# Patient Record
Sex: FEMALE
[De-identification: names, ages, dates, MRNs, and addresses within clinical notes are randomized per-mention and may not be internally consistent; named-entity substitution may affect disease eponyms.]

---

## 2017-09-12 ENCOUNTER — HOSPITAL ENCOUNTER (OUTPATIENT)
Dept: HOSPITAL 42 - SDS | Age: 72
Discharge: HOME | End: 2017-09-12
Attending: UROLOGY
Payer: MEDICARE

## 2017-09-12 VITALS — SYSTOLIC BLOOD PRESSURE: 123 MMHG | OXYGEN SATURATION: 94 % | HEART RATE: 67 BPM | DIASTOLIC BLOOD PRESSURE: 61 MMHG

## 2017-09-12 VITALS — BODY MASS INDEX: 41.6 KG/M2

## 2017-09-12 VITALS — TEMPERATURE: 97.7 F

## 2017-09-12 VITALS — RESPIRATION RATE: 18 BRPM

## 2017-09-12 DIAGNOSIS — R31.9: ICD-10-CM

## 2017-09-12 DIAGNOSIS — Z79.84: ICD-10-CM

## 2017-09-12 DIAGNOSIS — C66.1: Primary | ICD-10-CM

## 2017-09-12 DIAGNOSIS — I10: ICD-10-CM

## 2017-09-12 DIAGNOSIS — N13.30: ICD-10-CM

## 2017-09-12 DIAGNOSIS — E11.9: ICD-10-CM

## 2017-09-12 PROCEDURE — 88305 TISSUE EXAM BY PATHOLOGIST: CPT

## 2017-09-12 PROCEDURE — 88108 CYTOPATH CONCENTRATE TECH: CPT

## 2017-09-12 PROCEDURE — 52354 CYSTOURETERO W/BIOPSY: CPT

## 2017-09-12 NOTE — OP
PROCEDURE DATE:  09/12/2017



PREOPERATIVE DIAGNOSIS:  Hematuria.



POSTOPERATIVE DIAGNOSIS:  Right ureteral tumor.



SURGEON:  Dr. Chua.



TYPE OF ANESTHESIA:  LMA.



DESCRIPTION OF PROCEDURE:  After adequate LMA anesthesia was given, the

patient was placed lithotomy, prepped and draped in the usual manner.  A

22-Jamaican cystourethroscope was introduced.  Inspection of the bladder

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  No other

visible tumor on the bladder.  No foreign bodies or stones.  I attempted to

do a retrograde with a cone-tipped catheter, but no dye would go up. 

Again, with an open-ended catheter and use of a Glidewire, was able to

navigate a Glidewire up in to the kidney and then passed an open-ended

catheter over the Glidewire, removing the Glidewire.  Contrast was then

injected.  Prior to contrasting being injected, urine was obtained from the

right side for cytology.  After this was done, I did a contrast injection

through the open-ended catheter, there was a hydronephrosis and there

appeared to be filling defects in the lower third of the ureter.  At this

point, I placed the sensor wire through the open-ended catheter and removed

the open-ended catheter.  I then took a semi-rigid ureteroscope under

direct vision, could advance approximately quarter the way up the right

ureter and I could not see clearly due to the tumor.  Tumor specimen was

biopsied with forceps.  The ureteroscope was then removed around the

orifice of the right ureter, I fulgurated some oozing.  Over the sensor

wire, then placed a 24 cm 6-Jamaican pigtail stent, which coiled nicely in

the renal pelvis and the bladder when the wire was removed.   Attention was

then direct to the left side, where left retrograde ureteropyelograms are

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  The bladder was

drained.  There was no evidence of any bleeding.  The cystoscope was

removed.  The patient was awake and brought to the recovery room in good

condition.







__________________________________________

Aramis Chua MD



DD:  09/12/2017 12:26:58

DT:  09/12/2017 15:43:27

Job # [id number]

## 2017-09-12 NOTE — RAD
PROCEDURE:  Fluoroscopy up to 1 hour



HISTORY:

BILATERAL RETROGRADES / STENT INSERTION  (RIGHT)



COMPARISON:





TECHNIQUE:

Fluoroscopy was provided in the operating room. 198 seconds of fluoro 

time were used. Sixteen images were submitted



FINDINGS:

The left ureter and renal collecting system are unremarkable. There 

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



On the right side there is hydronephrosis and hydroureter. There is 

placement of a right ureteral stent. Surgical hardware in the spine 

overlies the ureter and portions of the collecting system



IMPRESSION:

As above

## 2017-12-08 ENCOUNTER — HOSPITAL ENCOUNTER (EMERGENCY)
Dept: HOSPITAL 42 - ED | Age: 72
Discharge: HOME | End: 2017-12-08
Payer: MEDICARE

## 2017-12-08 VITALS — TEMPERATURE: 99 F | OXYGEN SATURATION: 96 % | DIASTOLIC BLOOD PRESSURE: 62 MMHG | SYSTOLIC BLOOD PRESSURE: 124 MMHG

## 2017-12-08 VITALS — HEART RATE: 92 BPM | RESPIRATION RATE: 18 BRPM

## 2017-12-08 VITALS — BODY MASS INDEX: 39.8 KG/M2

## 2017-12-08 DIAGNOSIS — N39.0: Primary | ICD-10-CM

## 2017-12-08 DIAGNOSIS — I10: ICD-10-CM

## 2017-12-08 DIAGNOSIS — E11.65: ICD-10-CM

## 2017-12-08 LAB
ALBUMIN/GLOB SERPL: 1.2 {RATIO} (ref 1.1–1.8)
ALP SERPL-CCNC: 69 U/L (ref 38–126)
ALT SERPL-CCNC: 29 U/L (ref 7–56)
AMORPH SED URNS QL MICRO: (no result)
APPEARANCE UR: CLEAR
AST SERPL-CCNC: 26 U/L (ref 14–36)
BACTERIA #/AREA URNS HPF: (no result) /[HPF]
BASE EXCESS BLDV CALC-SCNC: 0 MMOL/L (ref 0–2)
BASOPHILS # BLD AUTO: 0.04 K/MM3 (ref 0–2)
BASOPHILS NFR BLD: 0.2 % (ref 0–3)
BILIRUB SERPL-MCNC: 1 MG/DL (ref 0.2–1.3)
BILIRUB UR-MCNC: NEGATIVE MG/DL
BUN SERPL-MCNC: 37 MG/DL (ref 7–21)
CALCIUM SERPL-MCNC: 9.6 MG/DL (ref 8.4–10.5)
CHLORIDE SERPL-SCNC: 96 MMOL/L (ref 98–107)
CO2 SERPL-SCNC: 24 MMOL/L (ref 21–33)
COLOR UR: YELLOW
EOSINOPHIL # BLD: 0 10*3/UL (ref 0–0.7)
EOSINOPHIL NFR BLD: 0 % (ref 1.5–5)
ERYTHROCYTE [DISTWIDTH] IN BLOOD BY AUTOMATED COUNT: 13.8 % (ref 11.5–14.5)
GLOBULIN SER-MCNC: 3.2 GM/DL
GLUCOSE SERPL-MCNC: 343 MG/DL (ref 70–110)
GLUCOSE UR STRIP-MCNC: 100 MG/DL
GRANULOCYTES # BLD: 14.23 10*3/UL (ref 1.4–6.5)
GRANULOCYTES NFR BLD: 84.7 % (ref 50–68)
HCT VFR BLD CALC: 33.7 % (ref 36–48)
KETONES UR STRIP-MCNC: NEGATIVE MG/DL
LEUKOCYTE ESTERASE UR-ACNC: (no result) LEU/UL
LYMPHOCYTES # BLD: 1.2 10*3/UL (ref 1.2–3.4)
LYMPHOCYTES NFR BLD AUTO: 7.2 % (ref 22–35)
MCH RBC QN AUTO: 29 PG (ref 25–35)
MCHC RBC AUTO-ENTMCNC: 32.6 G/DL (ref 31–37)
MCV RBC AUTO: 88.9 FL (ref 80–105)
MONOCYTES # BLD AUTO: 1.3 10*3/UL (ref 0.1–0.6)
MONOCYTES NFR BLD: 7.9 % (ref 1–6)
PH BLDV: 7.34 [PH] (ref 7.32–7.43)
PH UR STRIP: 6 [PH] (ref 4.7–8)
PLATELET # BLD: 294 10^3/UL (ref 120–450)
PMV BLD AUTO: 9.3 FL (ref 7–11)
POTASSIUM SERPL-SCNC: 4.3 MMOL/L (ref 3.6–5)
PROT SERPL-MCNC: 6.9 G/DL (ref 5.8–8.3)
PROT UR STRIP-MCNC: 30 MG/DL
RBC # UR STRIP: (no result) /UL
RBC #/AREA URNS HPF: (no result) /HPF (ref 0–2)
SODIUM SERPL-SCNC: 132 MMOL/L (ref 132–148)
SP GR UR STRIP: 1.02 (ref 1–1.03)
TROPONIN I SERPL-MCNC: < 0.01 NG/ML
UROBILINOGEN UR STRIP-ACNC: 0.2 E.U./DL
WBC # BLD AUTO: 16.8 10^3/UL (ref 4.5–11)
WBC #/AREA URNS HPF: (no result) /HPF (ref 0–6)

## 2017-12-08 PROCEDURE — 87086 URINE CULTURE/COLONY COUNT: CPT

## 2017-12-08 PROCEDURE — 80053 COMPREHEN METABOLIC PANEL: CPT

## 2017-12-08 PROCEDURE — 81001 URINALYSIS AUTO W/SCOPE: CPT

## 2017-12-08 PROCEDURE — 82948 REAGENT STRIP/BLOOD GLUCOSE: CPT

## 2017-12-08 PROCEDURE — 71010: CPT

## 2017-12-08 PROCEDURE — 84484 ASSAY OF TROPONIN QUANT: CPT

## 2017-12-08 PROCEDURE — 85025 COMPLETE CBC W/AUTO DIFF WBC: CPT

## 2017-12-08 PROCEDURE — 87181 SC STD AGAR DILUTION PER AGT: CPT

## 2017-12-08 PROCEDURE — 96360 HYDRATION IV INFUSION INIT: CPT

## 2017-12-08 PROCEDURE — 99284 EMERGENCY DEPT VISIT MOD MDM: CPT

## 2017-12-08 PROCEDURE — 82803 BLOOD GASES ANY COMBINATION: CPT
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
## 2017-12-08 NOTE — RAD
HISTORY:

cough  



COMPARISON:

Chest x-ray performed 5/15/17 



TECHNIQUE:

Chest, one view.



FINDINGS:

Examination limited by habitus.



LUNGS:

No focal consolidation.



Please note that chest x-ray has limited sensitivity for the 

detection of pulmonary masses.



PLEURA:

No significant pleural effusion identified. No definite pneumothorax .



CARDIOVASCULAR:

Heart size appears top normal.



OSSEOUS STRUCTURES:

 Degenerative changes of the spine.



VISUALIZED UPPER ABDOMEN:

Unremarkable.



OTHER FINDINGS:

None.



IMPRESSION:

No focal consolidation, significant pleural effusion, or definite 

pneumothorax identified.

## 2017-12-10 NOTE — ED PDOC
ED Additional Note





- Date & Time of Evaluation


Date of Evaluation: 12/10/17


Time of Evaluation: 10:56





- Physician Additional Note


Physician Additional Note: 





I received the urine culture result from RN Susan Reyes, show +Urine culture 

show klebsiella pneumoniae ssp, sensitive to fluoroquinlones which she 

discharge home with levaquin.

## 2018-02-10 ENCOUNTER — HOSPITAL ENCOUNTER (INPATIENT)
Dept: HOSPITAL 42 - ED | Age: 73
LOS: 3 days | Discharge: NURSING, REHAB OR LONG-TERM CARE | DRG: 690 | End: 2018-02-13
Attending: INTERNAL MEDICINE | Admitting: INTERNAL MEDICINE
Payer: MEDICARE

## 2018-02-10 VITALS — BODY MASS INDEX: 39.8 KG/M2

## 2018-02-10 DIAGNOSIS — N39.0: Primary | ICD-10-CM

## 2018-02-10 DIAGNOSIS — Z79.84: ICD-10-CM

## 2018-02-10 DIAGNOSIS — B96.1: ICD-10-CM

## 2018-02-10 DIAGNOSIS — Z98.42: ICD-10-CM

## 2018-02-10 DIAGNOSIS — I10: ICD-10-CM

## 2018-02-10 DIAGNOSIS — Z85.54: ICD-10-CM

## 2018-02-10 DIAGNOSIS — E78.5: ICD-10-CM

## 2018-02-10 DIAGNOSIS — E83.42: ICD-10-CM

## 2018-02-10 DIAGNOSIS — Z16.24: ICD-10-CM

## 2018-02-10 DIAGNOSIS — Z16.12: ICD-10-CM

## 2018-02-10 DIAGNOSIS — Z98.41: ICD-10-CM

## 2018-02-10 DIAGNOSIS — E11.9: ICD-10-CM

## 2018-02-10 DIAGNOSIS — Z79.82: ICD-10-CM

## 2018-02-10 LAB
APPEARANCE UR: (no result)
BACTERIA #/AREA URNS HPF: (no result) /[HPF]
BASOPHILS # BLD AUTO: 0.05 K/MM3 (ref 0–2)
BASOPHILS NFR BLD: 0.7 % (ref 0–3)
BILIRUB UR-MCNC: NEGATIVE MG/DL
BUN SERPL-MCNC: 28 MG/DL (ref 7–21)
CALCIUM SERPL-MCNC: 10.2 MG/DL (ref 8.4–10.5)
COLOR UR: YELLOW
EOSINOPHIL # BLD: 0.4 10*3/UL (ref 0–0.7)
EOSINOPHIL NFR BLD: 5.3 % (ref 1.5–5)
ERYTHROCYTE [DISTWIDTH] IN BLOOD BY AUTOMATED COUNT: 15.7 % (ref 11.5–14.5)
GFR NON-AFRICAN AMERICAN: 44
GLUCOSE UR STRIP-MCNC: 500 MG/DL
GRANULOCYTES # BLD: 4.45 10*3/UL (ref 1.4–6.5)
GRANULOCYTES NFR BLD: 59.2 % (ref 50–68)
HGB BLD-MCNC: 10.9 G/DL (ref 12–16)
LEUKOCYTE ESTERASE UR-ACNC: (no result) LEU/UL
LYMPHOCYTES # BLD: 2.1 10*3/UL (ref 1.2–3.4)
LYMPHOCYTES NFR BLD AUTO: 27.6 % (ref 22–35)
MCH RBC QN AUTO: 26.5 PG (ref 25–35)
MCHC RBC AUTO-ENTMCNC: 30.5 G/DL (ref 31–37)
MCV RBC AUTO: 86.9 FL (ref 80–105)
MONOCYTES # BLD AUTO: 0.5 10*3/UL (ref 0.1–0.6)
MONOCYTES NFR BLD: 7.2 % (ref 1–6)
PH UR STRIP: 6 [PH] (ref 4.7–8)
PLATELET # BLD: 376 10^3/UL (ref 120–450)
PMV BLD AUTO: 9.4 FL (ref 7–11)
PROT UR STRIP-MCNC: 30 MG/DL
RBC # BLD AUTO: 4.11 10^6/UL (ref 3.5–6.1)
RBC # UR STRIP: (no result) /UL
SP GR UR STRIP: 1.02 (ref 1–1.03)
URINE NITRATE: NEGATIVE
UROBILINOGEN UR STRIP-ACNC: 0.2 E.U./DL
WBC # BLD AUTO: 7.5 10^3/UL (ref 4.5–11)
WBC #/AREA URNS HPF: (no result) /HPF (ref 0–6)

## 2018-02-10 RX ADMIN — INSULIN HUMAN SCH: 100 INJECTION, SOLUTION PARENTERAL at 22:41
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
## 2018-02-11 LAB
ALBUMIN SERPL-MCNC: 3.4 G/DL (ref 3–4.8)
ALBUMIN/GLOB SERPL: 1.1 {RATIO} (ref 1.1–1.8)
ALT SERPL-CCNC: 24 U/L (ref 7–56)
AST SERPL-CCNC: 29 U/L (ref 14–36)
BASOPHILS # BLD AUTO: 0.05 K/MM3 (ref 0–2)
BASOPHILS NFR BLD: 0.6 % (ref 0–3)
BUN SERPL-MCNC: 25 MG/DL (ref 7–21)
CALCIUM SERPL-MCNC: 9.7 MG/DL (ref 8.4–10.5)
EOSINOPHIL # BLD: 0.5 10*3/UL (ref 0–0.7)
EOSINOPHIL NFR BLD: 5.8 % (ref 1.5–5)
ERYTHROCYTE [DISTWIDTH] IN BLOOD BY AUTOMATED COUNT: 15.5 % (ref 11.5–14.5)
GFR NON-AFRICAN AMERICAN: 40
GRANULOCYTES # BLD: 5.82 10*3/UL (ref 1.4–6.5)
GRANULOCYTES NFR BLD: 65.1 % (ref 50–68)
HGB BLD-MCNC: 10 G/DL (ref 12–16)
LYMPHOCYTES # BLD: 1.8 10*3/UL (ref 1.2–3.4)
LYMPHOCYTES NFR BLD AUTO: 20.3 % (ref 22–35)
MAGNESIUM SERPL-MCNC: 1.4 MG/DL (ref 1.7–2.2)
MCH RBC QN AUTO: 26.6 PG (ref 25–35)
MCHC RBC AUTO-ENTMCNC: 31 G/DL (ref 31–37)
MCV RBC AUTO: 85.9 FL (ref 80–105)
MONOCYTES # BLD AUTO: 0.7 10*3/UL (ref 0.1–0.6)
MONOCYTES NFR BLD: 8.2 % (ref 1–6)
PLATELET # BLD: 347 10^3/UL (ref 120–450)
PMV BLD AUTO: 9.2 FL (ref 7–11)
RBC # BLD AUTO: 3.76 10^6/UL (ref 3.5–6.1)
T4 FREE SERPL-MCNC: 0.74 NG/DL (ref 0.78–2.19)
WBC # BLD AUTO: 8.9 10^3/UL (ref 4.5–11)

## 2018-02-11 RX ADMIN — INSULIN HUMAN SCH UNITS: 100 INJECTION, SOLUTION PARENTERAL at 12:04

## 2018-02-11 RX ADMIN — INSULIN HUMAN SCH UNITS: 100 INJECTION, SOLUTION PARENTERAL at 16:47

## 2018-02-11 RX ADMIN — INSULIN HUMAN SCH UNITS: 100 INJECTION, SOLUTION PARENTERAL at 07:48

## 2018-02-11 RX ADMIN — MEROPENEM AND SODIUM CHLORIDE SCH MLS/HR: 1 INJECTION, SOLUTION INTRAVENOUS at 13:44

## 2018-02-11 RX ADMIN — INSULIN HUMAN SCH: 100 INJECTION, SOLUTION PARENTERAL at 22:11

## 2018-02-11 RX ADMIN — MEROPENEM AND SODIUM CHLORIDE SCH MLS/HR: 1 INJECTION, SOLUTION INTRAVENOUS at 12:10

## 2018-02-11 RX ADMIN — MEROPENEM AND SODIUM CHLORIDE SCH MLS/HR: 1 INJECTION, SOLUTION INTRAVENOUS at 22:12

## 2018-02-11 NOTE — HP
HISTORY OF PRESENT ILLNESS:  Patient is a 73-year-old female patient of Dr. Roland, was referred to emergency room by urologist, Dr. Garcia, who did

urine culture on 02/01/2018 and was found to have resistant Klebsiella UTI,

only sensitive to IV antibiotics, so she was referred to emergency room for

IV antibiotics.  Patient does complain of increased frequency of urination

and discomfort.  Denies any fever or chills.  No history of nausea or

vomiting.  No abdominal pain.  No diarrhea.  Patient does have a history of

recurrent UTI.



PAST MEDICAL HISTORY:  Her significant past medical history is:

1.  Hypertension.

2.  Non-insulin-dependent diabetes.

3.  Ureteral cancer.



PAST SURGICAL HISTORY:  She had a surgical excision on 11/27/2017, when she

recently had a stent removed.



ALLERGIES:  SHE IS NOT ALLERGIC TO ANY MEDICATION.



MEDICATIONS AT HOME:  Patient is on metformin 1000 twice a day, glipizide

_____ mg before breakfast and 5 mg at bedtime.  She is on Cymbalta 30 mg

daily, aspirin 81 mg daily, multivitamin, Januvia 100 mg daily, Crestor 5

mg daily, losartan 100 mg daily, gabapentin 300 three times a day.



SOCIAL HISTORY:  Denies smoking, drinking, alcohol use.



REVIEW OF SYSTEMS:  Significant for urinary discomfort.



PHYSICAL EXAMINATION:

GENERAL:  She is awake, alert, oriented, communicative.

VITAL SIGNS:  She is afebrile, pulse 82, respirations 20, blood pressure

138/69.

LUNGS:  Bilateral good airflow.  No rhonchi or crackle.

HEART:  S1 and S2 audible.

ABDOMEN:  Soft, obese, nontender.  No rebound.  No guarding.

NEUROLOGIC:  She is awake, alert, oriented, able to communicate.



LABORATORY DATA:  WBC 7.5, hemoglobin 10.9, hematocrit 35.7, platelet 376. 

Chemistry:  Sodium 145, potassium 5.3, chloride 106, CO2 26, BUN 28,

creatinine 1.2, blood sugar of 204.  Urinalysis shows moderately large

leukocytes, WBCs too numerous to count.



ASSESSMENT AND PLAN:

1.  Recurrent urinary tract infection.  Sensitivity done in urologist

office shows Klebsiella.

2.  Non-insulin-dependent diabetes.

3.  Hypertension.

4.  Hyperlipidemia.



PLAN:  Patient is sensitive to cefepime, we will continue that.  She

already has received one dose of cefepime in the ER.  We will monitor her

blood sugar.  Blood cultures and urine culture have been sent, and we will

follow up her electrolytes in the a.m. and ID consult with Dr. Narayan

has been requested.





__________________________________________

Ashely Armstrong MD





DD:  02/10/2018 22:29:03

DT:  02/11/2018 4:41:05

Job # [id number]

## 2018-02-11 NOTE — CON
DATE:  02/11/2018



LOCATION:  The patient is seen earlier today in 570, bed 1.



CHIEF COMPLAINT:  The patient's possible urinary tract infection times a

few days.



HISTORY OF PRESENT ILLNESS:  This is a 73-year-old female with history of

diabetes mellitus, hypertension, urethral cancer and renal stents removed

2-3 weeks ago.  Presents to the emergency room with urinary symptoms of

frequency and was seen by Dr. Garcia, urologist on 02/01/2018 and reported

having resistant urinary tract infections, so he transferred the patient

for resistant Klebsiella and IV antibiotic treatment.  The patient states

she does not have dysuria, but she did have frequency.  She did have

low-grade fevers.  No chills.  No abdominal pain.  No flank pain.  No back

pain.  No chest pain, shortness of breath or cough.



PAST MEDICAL HISTORY:  Significant for hypertension, diabetes and

hyperlipidemia and ureteral cancer.



PAST SURGICAL HISTORY:  Significant for tubal ligation, right knee surgery,

right foot surgery and a urology surgery including the removal of the renal

stent 2-3 weeks ago.



ALLERGIES:  THE PATIENT HAS NO KNOWN ALLERGIES.



MEDICATIONS AT HOME:  Include metformin, glipizide, duloxetine, aspirin,

rosuvastatin, multivitamins.



PHYSICAL EXAMINATION:

GENERAL:  The patient is in bed, in no acute distress, answering questions

appropriately with temperature of 99, heart rate of 82, respiratory rate of

20, blood pressure is 120/40.

HEENT:  Examination of HEENT is unremarkable.

NECK:  Supple.

LUNGS:  Have decreased breath sounds.

HEART:  Normal S1, S2.

ABDOMEN:  Soft, nontender.



LABORATORY DATA:  Laboratory examination reveals a white count of 8.9,

hemoglobin of 10, platelets of 347, BUN of 28, creatinine is 1.2.  Random

glucose is elevated.  Urinalysis is noted to have too numerous wbc's. 

Urine culture has gram-negative tameka.  Urine culture from 12/2017 had ESBL

Klebsiella.



ASSESSMENT AND PLAN:  A 73-year-old female with a history of ureteral

cancer with surgery and stent placement, stent removed 2-3 weeks ago with

history of hypertension, diabetes, hyperlipidemia.  Had frequency, but no

dysuria as outpatient and seen doctors as outpatient and was given

antibiotics as outpatient and found to have resistant extended-spectrum

beta-lactamase Klebsiella on last admission and resistant organism on this

admission as outpatient.  Now #1 is a gram-negative tameka urinary tract

infection, found to be resistant as outpatient, most likely recurrence of

extended-spectrum beta-lactamase Klebsiella.  We will discontinue the

cefepime and start meropenem.  Pending the identification sensitivity of

the gram-negative tameka in the urine and we will check on the blood cultures

and we will make further recommendations upon availability of urine

cultures.  We will place the patient on extended-spectrum beta-lactamase

precautions since the patient had a positive extended-spectrum

beta-lactamase Klebsiella in December.



__________________________________________

Elver Narayan MD





DD:  02/11/2018 11:16:46

DT:  02/11/2018 11:23:21

Job # [id number]

## 2018-02-12 VITALS — RESPIRATION RATE: 20 BRPM

## 2018-02-12 RX ADMIN — INSULIN HUMAN SCH UNITS: 100 INJECTION, SOLUTION PARENTERAL at 16:20

## 2018-02-12 RX ADMIN — INSULIN HUMAN SCH UNITS: 100 INJECTION, SOLUTION PARENTERAL at 08:10

## 2018-02-12 RX ADMIN — INSULIN HUMAN SCH UNITS: 100 INJECTION, SOLUTION PARENTERAL at 12:13

## 2018-02-12 RX ADMIN — MEROPENEM AND SODIUM CHLORIDE SCH MLS/HR: 1 INJECTION, SOLUTION INTRAVENOUS at 22:05

## 2018-02-12 RX ADMIN — INSULIN HUMAN SCH: 100 INJECTION, SOLUTION PARENTERAL at 22:03

## 2018-02-12 RX ADMIN — MEROPENEM AND SODIUM CHLORIDE SCH MLS/HR: 1 INJECTION, SOLUTION INTRAVENOUS at 05:25

## 2018-02-12 NOTE — PN
DATE:



SUBJECTIVE:  Patient has no complaints of any chest pain, shortness of

breath, headaches, or dizziness.



PHYSICAL EXAMINATION:

VITAL SIGNS:  Temperature is 99.1, pulse is 71, blood pressure is 120/48,

and respirations 18.

GENERAL:  The patient is lying in bed, flat, comfortable.

HEENT:  No oral lesion.  Anicteric sclerae.  Moist mucosa.

NECK:  No JVD, adenopathy, or thyromegaly.

CARDIOVASCULAR:  S1 and S2, regular.  No murmurs, rubs, or gallops.

LUNGS:  Clear to auscultation bilaterally.  No wheeze, rales, or rhonchi.

ABDOMEN:  Bowel sounds are positive, soft, nontender, and nondistended.

EXTREMITIES:  No cyanosis, clubbing, or edema.



LABORATORY DATA:  White count is 8.9 and hemoglobin is 10.  Chemistry shows

sodium 142, potassium 4.2, magnesium is 1.4, and creatinine is 1.3.



ASSESSMENT:

1.  Urinary tract infection.

2.  Diabetes type 2.

3.  Hypertension.

4.  Dyslipidemia.

5.  Hypomagnesemia.



PLAN:  The patient had urinary tract infection.  She has gram-negative rods

that are present in her urine.  She is being followed by Dr. Narayan. 

The patient is on Cymbalta.  She is going to continue on aspirin daily. 

She is on Januvia for diabetes.  She is on meropenem for antibiotics.  We

will await culture results and further input from Dr. Narayan from

Infectious Disease.





__________________________________________

Sharif Rico MD



DD:  02/11/2018 19:03:58

DT:  02/11/2018 22:52:17

Job # [id number]

## 2018-02-12 NOTE — CP.PCM.PN
Subjective





- Date & Time of Evaluation


Date of Evaluation: 02/12/18


Time of Evaluation: 10:50





- Subjective


Subjective: 





Comfortable, no urinary symptoms currently, no fevers.





Objective





- Vital Signs/Intake and Output


Vital Signs (last 24 hours): 


 











Temp Pulse Resp BP Pulse Ox


 


 97.7 F   78   18   132/56 L  95 


 


 02/12/18 07:36  02/12/18 07:36  02/12/18 07:36  02/12/18 07:36  02/12/18 07:36








Intake and Output: 


 











 02/12/18 02/13/18





 18:59 06:59


 


Intake Total 480 


 


Balance 480 














- Medications


Medications: 


 Current Medications





Aspirin (Aspirin Chewable)  81 mg PO DAILY [facility]


   Last Admin: 02/12/18 10:04 Dose:  81 mg


Duloxetine HCl (Cymbalta)  20 mg PO DAILY [facility]


   Last Admin: 02/12/18 10:05 Dose:  20 mg


Folic Acid (Folic Acid)  1 mg PO DAILY [facility]


   Last Admin: 02/12/18 10:04 Dose:  1 mg


Meropenem/Sodium Chloride (Meropenem 1g/Ns 100ml Ivpb)  1 gm in 100 mls @ 100 

mls/hr IVPB Q12 [facility]


   PRN Reason: Protocol


   Stop: 02/21/18 11:16


Insulin Human Regular (Humulin R Low)  0 units SC ACHS [facility]


   PRN Reason: Protocol


   Last Admin: 02/12/18 16:20 Dose:  3 units


Losartan Potassium (Cozaar)  100 mg PO DAILY [facility]


   Last Admin: 02/12/18 10:04 Dose:  100 mg


Metformin HCl (Glucophage)  500 mg PO BID [facility]


Sitagliptin Phosphate (Januvia)  100 mg PO DAILY [facility]











- Labs


Labs: 


 





 02/11/18 07:30 





 02/11/18 07:30 











- Constitutional


Appears: Non-toxic





- Head Exam


Head Exam: NORMAL INSPECTION





- ENT Exam


ENT Exam: Mucous Membranes Moist





- Neck Exam


Neck Exam: absent: Meningismus





- Respiratory Exam


Respiratory Exam: Decreased Breath Sounds





- Cardiovascular Exam


Cardiovascular Exam: +S1, +S2





- GI/Abdominal Exam


GI & Abdominal Exam: Soft.  absent: Tenderness





Assessment and Plan





- Assessment and Plan (Free Text)


Plan: 





Assessment


UTI due to ESBL Klebsiella


HTN


DM


dyslpidemia


uterine CA


S/P right knee surgery


S/P right foot surgery


S/P renal stent placement





Plan


Continue Merrem day 2, should complete up to 10 days of antibiotics

## 2018-02-13 ENCOUNTER — HOSPITAL ENCOUNTER (INPATIENT)
Dept: HOSPITAL 42 - TRCU | Age: 73
LOS: 5 days | Discharge: HOME | DRG: 690 | End: 2018-02-18
Attending: INTERNAL MEDICINE | Admitting: INTERNAL MEDICINE
Payer: COMMERCIAL

## 2018-02-13 VITALS — BODY MASS INDEX: 37 KG/M2

## 2018-02-13 VITALS
SYSTOLIC BLOOD PRESSURE: 118 MMHG | TEMPERATURE: 98.6 F | HEART RATE: 61 BPM | DIASTOLIC BLOOD PRESSURE: 49 MMHG | OXYGEN SATURATION: 98 %

## 2018-02-13 DIAGNOSIS — E78.5: ICD-10-CM

## 2018-02-13 DIAGNOSIS — Z16.24: ICD-10-CM

## 2018-02-13 DIAGNOSIS — Z79.82: ICD-10-CM

## 2018-02-13 DIAGNOSIS — N30.90: Primary | ICD-10-CM

## 2018-02-13 DIAGNOSIS — E11.65: ICD-10-CM

## 2018-02-13 DIAGNOSIS — B96.1: ICD-10-CM

## 2018-02-13 DIAGNOSIS — I10: ICD-10-CM

## 2018-02-13 DIAGNOSIS — Z79.84: ICD-10-CM

## 2018-02-13 DIAGNOSIS — E66.01: ICD-10-CM

## 2018-02-13 DIAGNOSIS — Z87.440: ICD-10-CM

## 2018-02-13 DIAGNOSIS — Z79.899: ICD-10-CM

## 2018-02-13 PROCEDURE — F07M6ZZ THERAPEUTIC EXERCISE TREATMENT OF MUSCULOSKELETAL SYSTEM - WHOLE BODY: ICD-10-PCS | Performed by: INTERNAL MEDICINE

## 2018-02-13 PROCEDURE — F07Z9ZZ GAIT TRAINING/FUNCTIONAL AMBULATION TREATMENT: ICD-10-PCS | Performed by: INTERNAL MEDICINE

## 2018-02-13 RX ADMIN — MEROPENEM AND SODIUM CHLORIDE SCH MLS/HR: 1 INJECTION, SOLUTION INTRAVENOUS at 09:59

## 2018-02-13 RX ADMIN — MEROPENEM AND SODIUM CHLORIDE SCH MLS/HR: 1 INJECTION, SOLUTION INTRAVENOUS at 17:54

## 2018-02-13 RX ADMIN — INSULIN HUMAN SCH UNITS: 100 INJECTION, SOLUTION PARENTERAL at 17:30

## 2018-02-13 RX ADMIN — INSULIN HUMAN SCH UNITS: 100 INJECTION, SOLUTION PARENTERAL at 08:38

## 2018-02-13 RX ADMIN — INSULIN HUMAN SCH UNITS: 100 INJECTION, SOLUTION PARENTERAL at 11:58

## 2018-02-13 RX ADMIN — INSULIN HUMAN SCH: 100 INJECTION, SOLUTION PARENTERAL at 22:07

## 2018-02-13 NOTE — PN
DATE:



SUBJECTIVE:  Patient is 73 years old, who was admitted because of

multidrug-resistant UTI, only sensitive to IV antibiotics.  Patient had a

_____ surgery done recently.  Had stent placed that was also removed a few

weeks ago.  Patient went for routine checkup by her urologist who did

culture and was found to have Klebsiella UTI and referred to ER for IV

antibiotics.



PHYSICAL EXAMINATION:

GENERAL:  Today, she is awake, alert, oriented, communicative.

VITAL SIGNS:  She is afebrile, pulse 70, respirations 18, blood pressure

132/56.

LUNGS:  Bilateral good airflow.  No rhonchi or crackle.

HEART:  S1 and S2 audible.

ABDOMEN:  Soft, nontender.  No rebound.  No guarding.

NEUROLOGIC:  She is awake, alert, oriented, communicative, ambulatory.



LABORATORY DATA:  Blood sugar is 263.  Urine cultures are positive for

Klebsiella pneumonia.  Blood cultures are negative.



ASSESSMENT:

1.  Status post urethral mass resection.

2.  Recurrent urinary tract infection.

3.  Klebsiella urinary tract infection.

4.  Non-insulin dependant diabetes.

5.  Hypertension.

6.  Hyperlipidemia.



PLAN:  I will increase her Januvia to 100.  Also start her on metformin and

monitor her blood sugar.  TCU evaluation will be requested.  If the patient

is accepted in TCU, she will be transferred there to complete her course of

antibiotics.





__________________________________________

Ashely Armstrong MD



DD:  02/12/2018 19:38:24

DT:  02/12/2018 21:04:39

Job # [id number]

## 2018-02-14 RX ADMIN — INSULIN HUMAN SCH UNITS: 100 INJECTION, SOLUTION PARENTERAL at 17:30

## 2018-02-14 RX ADMIN — INSULIN HUMAN SCH: 100 INJECTION, SOLUTION PARENTERAL at 06:37

## 2018-02-14 RX ADMIN — MEROPENEM AND SODIUM CHLORIDE SCH MLS/HR: 1 INJECTION, SOLUTION INTRAVENOUS at 05:12

## 2018-02-14 RX ADMIN — INSULIN HUMAN SCH UNITS: 100 INJECTION, SOLUTION PARENTERAL at 12:30

## 2018-02-14 RX ADMIN — MEROPENEM AND SODIUM CHLORIDE SCH MLS/HR: 1 INJECTION, SOLUTION INTRAVENOUS at 17:47

## 2018-02-14 RX ADMIN — INSULIN HUMAN SCH: 100 INJECTION, SOLUTION PARENTERAL at 21:56

## 2018-02-14 NOTE — CON
DATE:  02/12/2018



UROLOGY CONSULTATION



CHIEF COMPLAINT:  Urinary tract infection.



HISTORY OF PRESENT ILLNESS:  This is a 73-year-old female who is known to

my practice.  Patient had a resection of a ureteral tumor with a

reimplantation done by  _____.  During her followup exam, patient was

found to have a Klebsiella urinary tract infection which was resistant to

oral antibiotics.  Patient reports she was having some urinary frequency,

but no acute dysuria.  She denied any fever or chills.  Denies any nausea

or vomiting.  She does not report a history of recurrent urinary tract

infections until after having the surgery done.  A  consultation was then

requested.



PAST MEDICAL HISTORY:  Significant for hypertension, diabetes, ureteral

carcinoma.



MEDICATIONS:  At home include metformin, glipizide, Cymbalta, aspirin,

Januvia, Crestor, losartan, and Neurontin.  Currently on meropenem.



ALLERGIES:  NO KNOWN DRUG ALLERGIES.



FAMILY HISTORY:  Noncontributory.



SOCIAL HISTORY:  No smoking or EtOH use.



REVIEW OF SYSTEMS:  Twelve-point review of systems was obtained.  Positives

as per the history of present illness.  Otherwise, denies any other acute

issues.



PHYSICAL EXAMINATION:

GENERAL:  Patient is awake and alert.  She is in no acute distress.

VITAL SIGNS:  She has been afebrile, /60, respirations 20, pulse of

86.

NECK:  Supple.  There is no adenopathy.

CHEST:  Exam of the chest reveals a normal inspiratory effort.

CARDIAC:  Shows positive S1, S2.  There is no peripheral edema noted.

ABDOMEN:  Her abdomen is soft, nontender, nondistended.  There is no

hepatosplenomegaly.  There is no costovertebral angle tenderness.

EXTREMITIES:  There is no cyanosis or edema.



LABORATORY EXAMINATION:  WBC count was 8.9.  Urine culture showed

Klebsiella pneumonia.  Blood cultures showed no growth.



RADIOLOGIC EXAMINATION:  No recent pertinent imaging has been done.



IMPRESSION:  This is a 73-year-old female with history of a ureteral tumor,

who developed a urinary infection with her stent in place.  The stent was

recently removed and the patient has now had a resistant urinary infection.

She was admitted for intravenous antibiotics.  Her voiding symptoms have

improved with appropriate antibiotics.  Blood cultures were negative.  She

has no evidence of sepsis.



PLAN:  Will be to continue IV antibiotics as per ID consultation.  I would

plan on a renal ultrasound to assess as her stent was recently removed.  If

possible, the patient can be treated with outpatient IV antibiotics or if

possible, per ID recommendation possibly the patient could be switched to

an oral antibiotic after a few days of IV antibiotics, but I will leave

that recommendation to the ID consultant once the further spectrum of

sensitivities are known regarding the Klebsiella urinary infection. 

Patient will need to follow with me in the office regarding her ureteral

tumor.  She needs to be scheduled for a followup ureteroscopy and

cystoscopy to assess.



Thank you for allowing me to participate the care of this patient.  We will

follow her with you.





__________________________________________

Julien Cannon MD



DD:  02/13/2018 18:41:34

DT:  02/13/2018 18:47:35

Job # [id number]

## 2018-02-14 NOTE — DS
HISTORY OF PRESENT ILLNESS:  Patient is 73 years old, seen and examined,

lying in bed.  Seem to be comfortable.  No nausea, no vomiting, no

diarrhea.  No fever, no chills.  Patient was admitted because of positive

urine culture.  Patient recently had stent removed 3 to 4 weeks ago after

she had urethral mass resection done.  She is currently asymptomatic.



PHYSICAL EXAMINATION:

VITAL SIGNS:  She is afebrile, pulse 61, respirations 20, blood pressure

118/49

LUNGS:  Bilateral good airflow.  No rhonchi or crackles.

HEART:  S1 and S2 audible.

ABDOMEN:  Soft, nontender.  No rebound.  No guarding.

NEUROLOGICAL:  Patient is awake, alert, oriented and communicative.



LABORATORY DATA:  Her urine culture was positive for Klebsiella pneumoniae.



ASSESSMENT:

1.  Multi-drug resistant urinary tract infection, only sensitive to

intravenous antibiotics.

2.  Non-insulin dependent diabetes.

3.  Hypertension.

4.  Hyperlipidemia.

5.  History of bilateral cataract extraction.

6.  Status post left thumb amputation.



PLAN:  Currently patient is on aspirin 81 daily, losartan.  We will

increase her metformin to 1000 q.12 that she takes at home.  Patient need

to complete her course of antibiotic for total of 7 days.  She need 4 or 5

more days.  She is being transferred to TCU, where she will complete her

course of antibiotic.







__________________________________________

Ashely Armstrong MD





DD:  02/13/2018 12:57:43

DT:  02/14/2018 5:33:28

Job # [id number]

## 2018-02-14 NOTE — PN
DATE:  02/13/2018



SUBJECTIVE:  The patient was seen earlier this morning in room 570, bed 1. 

She is doing well.  No fevers and chills.  No nausea and vomiting. 

Tolerating the antibiotics.



PHYSICAL EXAMINATION:

VITAL SIGNS:  Temperature is 98, blood pressure is 118/40, respiratory rate

of 20.

HEENT:  Examination of HEENT is unremarkable.

NECK:  Supple.

LUNGS:  Decreased breath sounds.

HEART:  Normal S1 and S2.

ABDOMEN:  Soft, nontender.



LABORATORY DATA:  Laboratory examination reveals a white count of 8.9,

hemoglobin of 10, platelets of 347.  Chemistries reveals BUN of 25,

creatinine of 1.3.  Urinalysis is noted.  Microbiology reveals that

Klebsiella which is ESBL in the urine.  The blood cultures have no growth.



ASSESSMENT AND PLAN:  This is a 73-year-old female who was seen earlier

this morning in room 570, bed 1 with  Extended spectrum beta-lactamase

Klebsiella urinary tract infection in a patient who has had renal stent

placement and hypertensive diabetic, had cloudy urine and urinary symptoms.

Today is day #3 of meropenem, would complete 7 to 10 days of meropenem. 

Case discussed with Dr. Armstrong.  We will follow with you.  The patient for

possible transfer to transitional care.





__________________________________________

Elver Narayan MD



DD:  02/13/2018 19:26:16

DT:  02/13/2018 21:39:10

Job # [id number]

## 2018-02-15 LAB
APPEARANCE UR: (no result)
BACTERIA #/AREA URNS HPF: (no result) /[HPF]
BILIRUB UR-MCNC: NEGATIVE MG/DL
COLOR UR: (no result)
GLUCOSE UR STRIP-MCNC: NEGATIVE MG/DL
LEUKOCYTE ESTERASE UR-ACNC: (no result) LEU/UL
PH UR STRIP: 6 [PH] (ref 4.7–8)
PROT UR STRIP-MCNC: (no result) MG/DL
RBC # UR STRIP: (no result) /UL
SP GR UR STRIP: 1.02 (ref 1–1.03)
URINE NITRATE: NEGATIVE
UROBILINOGEN UR STRIP-ACNC: 0.2 E.U./DL

## 2018-02-15 PROCEDURE — F08Z4ZZ HOME MANAGEMENT TREATMENT: ICD-10-PCS | Performed by: INTERNAL MEDICINE

## 2018-02-15 RX ADMIN — MEROPENEM AND SODIUM CHLORIDE SCH MLS/HR: 1 INJECTION, SOLUTION INTRAVENOUS at 17:35

## 2018-02-15 RX ADMIN — INSULIN HUMAN SCH: 100 INJECTION, SOLUTION PARENTERAL at 21:42

## 2018-02-15 RX ADMIN — MEROPENEM AND SODIUM CHLORIDE SCH MLS/HR: 1 INJECTION, SOLUTION INTRAVENOUS at 05:15

## 2018-02-15 RX ADMIN — INSULIN HUMAN SCH UNITS: 100 INJECTION, SOLUTION PARENTERAL at 06:46

## 2018-02-15 RX ADMIN — INSULIN HUMAN SCH: 100 INJECTION, SOLUTION PARENTERAL at 17:45

## 2018-02-15 RX ADMIN — INSULIN HUMAN SCH UNITS: 100 INJECTION, SOLUTION PARENTERAL at 12:58

## 2018-02-15 NOTE — PN
DATE:



SUBJECTIVE:  Patient is 73 years old.  Seen and examined.  No nausea,

vomiting or diarrhea.  No frothy urine.  No cloudy urine.  No hematuria. 

Eating and tolerating.



PHYSICAL EXAMINATION:

VITAL SIGNS:  She is afebrile, pulse is 84, respirations 20, blood pressure

114/57.

LUNGS:  Bilateral good airflow.  No rhonchi or crackle.

HEART:  S1 and S2 audible.

ABDOMEN:  Soft, nontender.  No rebound.  No guarding.

NEUROLOGIC:  Patient is awake, alert, oriented, communicative, ambulatory.



LABORATORY DATA:  Blood sugar is 104.



ASSESSMENT:

1.  Recurrent urinary tract infection.

2.  Status post urethral mass resection.

3.  Non-insulin-dependent diabetes.

4.  Hyperlipidemia.

5.  Morbid obesity.

 

PLAN:  We will continue patient on current medication and order for

urinalysis and monitor her blood sugar.  Discharge plan discussed.







__________________________________________

Ashely Armstrong MD





DD:  02/15/2018 18:29:47

DT:  02/15/2018 19:57:21

Job # [id number]

## 2018-02-15 NOTE — CON
DATE:  02/14/2018



LOCATION:  Patient is seen earlier today in room 320.



CHIEF COMPLAINT:  Weakness times several days.



HISTORY OF PRESENT ILLNESS:  This is a 73-year-old female with past medical

history significant for diabetes mellitus, hypertension, ureteral cancer,

renal stents, which was removed 2 to 3 weeks prior to admission.  Patient

was admitted with having urinary symptoms, was seen by a urologist who sent

for cultures, had resistant Klebsiella, and patient _____ she did have some

frequency and dysuria and has cloudy-looking urine.



REVIEW OF SYSTEMS:  Reveals no flank pain; no chest pain; no abdominal

pain, diarrhea, or constipation.



PAST MEDICAL HISTORY:  Significant for hypertension, diabetes, ureteral

cancer, hyperlipidemia, and a recent renal stent removal.



PAST SURGICAL HISTORY:  Significant for right knee surgery, tubal ligation,

and right foot surgery.



ALLERGIES:  PATIENT HAS NO KNOWN ALLERGIES.



MEDICATIONS:  Reviewed.



PHYSICAL EXAMINATION:

VITAL SIGNS:  Temperature is 98, blood pressure is 120/70, respiratory rate

of 16.

HEENT:  Unremarkable.

NECK:  Supple.

LUNGS:  Have decreased breath sounds.

HEART:  Normal S1 and S2.

ABDOMEN:  Soft, nontender.



LABORATORY DATA:  Laboratory examinations are reviewed and include a white

count of 8, hemoglobin of 10.  A creatinine of 1.3.  Urinalysis is

reviewed.  Microbiology, as noted, ESBL Klebsiella.



ASSESSMENT:  This is a 73-year-old female with extended-spectrum

beta-lactamases Klebsiella urinary tract infection after removal of a renal

stent and today is day number 4 of 7 to 10 days of meropenem and we will

follow closely with you.



Review of the orders revealed the patient to be on meropenem _____; today

is day number 4 of 7 to 10 days.



Case discussed with Dr. Armstrong.







__________________________________________

Elver Narayan MD



DD:  02/14/2018 13:22:13

DT:  02/14/2018 22:32:56

Job # [id number]

## 2018-02-15 NOTE — HP
HISTORY OF PRESENT ILLNESS:  The patient is a 73-year-old who was seen by

her urologist who did urine culture and was found to have

multidrug-resistant UTI.  She was tried on p.o. antibiotic with no relief. 

The patient states prior to coming to the hospital, she was having very

frothy cloudy urine.  So, she was referred to ER for IV antibiotic.  The

patient grew Klebsiella infection, was started on IV meropenem and is being

transferred to TCU to complete her course of antibiotic.  Denies any fever

or chills.  No nausea or vomiting.  No burning.  No abdominal pain.



PAST MEDICAL HISTORY:  Significant for:

1.  Hypertension.

2.  Non-insulin-dependent diabetes.

3.  History of ureteral cancer, had resection done last month, followed by

stent placement, but that was also removed.



ALLERGIES:  SHE IS NOT ALLERGIC TO ANY MEDICATION.



MEDICATIONS AT HOME:  She is on metformin 1000 twice a day, Januvia 100 mg

daily, Crestor 5 mg daily, multivitamin, Cozaar, glipizide 15 mg before

breakfast and 25 at bedtime, gabapentin 300 three times a day, and aspirin

81 daily.



SOCIAL HISTORY:  She lives with her son.  Denies smoking, drinking or

alcohol use.



REVIEW OF SYSTEMS:  Has no significant complaint.



PHYSICAL EXAMINATION:

GENERAL:  She is awake, alert, oriented, communicative.

VITAL SIGNS:  She is afebrile, pulse 82, respirations 18, blood pressure

136/63.

LUNGS:  Bilateral good airflow.  No rhonchi or crackle.

HEART:  S1 and S2 audible.

ABDOMEN:  Soft, nontender.  No rebound.  No guarding.

NEUROLOGIC:  She is awake and alert, able to communicate.



LABORATORY DATA:  Blood sugar is 225.



ASSESSMENT:

1.  Non-insulin-dependent diabetes.

2.  Status post urethral tumor resection.

3.  Multidrug-resistant urinary tract infection.

4.  Klebsiella urinary tract infection.

5.  Uncontrolled diabetes.



PLAN:  I will increase metformin to 1000 twice a day.  I will start her on

Januvia and she will be started on glipizide and monitor her blood sugar

closely.  The patient to finish her course of antibiotic, that is

meropenem, on Saturday.  Then, we will make discharge plan.





__________________________________________

Ashely Armstrong MD



DD:  02/14/2018 19:00:22

DT:  02/15/2018 2:59:48

Job # [id number]

## 2018-02-16 RX ADMIN — INSULIN HUMAN SCH: 100 INJECTION, SOLUTION PARENTERAL at 16:45

## 2018-02-16 RX ADMIN — MEROPENEM AND SODIUM CHLORIDE SCH MLS/HR: 1 INJECTION, SOLUTION INTRAVENOUS at 05:31

## 2018-02-16 RX ADMIN — INSULIN HUMAN SCH: 100 INJECTION, SOLUTION PARENTERAL at 22:00

## 2018-02-16 RX ADMIN — MEROPENEM AND SODIUM CHLORIDE SCH MLS/HR: 1 INJECTION, SOLUTION INTRAVENOUS at 17:10

## 2018-02-16 RX ADMIN — INSULIN HUMAN SCH: 100 INJECTION, SOLUTION PARENTERAL at 06:40

## 2018-02-16 RX ADMIN — INSULIN HUMAN SCH UNITS: 100 INJECTION, SOLUTION PARENTERAL at 12:26

## 2018-02-16 NOTE — PN
DATE:  02/15/2018



SUBJECTIVE:  Patient was seen earlier today in room 320.  No fevers, no

chills.



PHYSICAL EXAMINATION:

VITAL SIGNS:  Temperature is 98, blood pressure 120/70, respiratory rate

16.

HEENT:  Unremarkable.

NECK:  Supple.

LUNGS:  Have decreased breath sounds.

HEART:  Normal S1, S2.

ABDOMEN:  Soft.



LABORATORY DATA:  Reviewed.



ASSESSMENT AND PLAN:  This is a 73-year-old female with extended-spectrum

beta-lactamases Klebsiella urinary tract infection and possible cystitis,

had a renal stent, which was removed.  Today is day #5 of 7-10 days.  We

will continue with the meropenem.  Patient is inquiring about going home. 

We will follow closely with you.







__________________________________________

Elver Narayan MD





DD:  02/15/2018 22:40:45

DT:  02/15/2018 23:31:58

Job # [id number]

## 2018-02-16 NOTE — PN
DATE:



SUBJECTIVE:  Patient is 73 years old.  Seen and examined.  Sitting in

chair, seems to be comfortable, anxious to go home.



PHYSICAL EXAMINATION:

VITAL SIGNS:  Patient is afebrile.  Pulse is 98, respirations 18, blood

pressure 135/89.

LUNGS:  Bilateral good airflow.  No rhonchi or crackle.

HEART:  S1 and S2 audible.

ABDOMEN:  Soft, nontender.  No rebound.  No guarding.

NEUROLOGIC:  She is awake, alert, oriented, communicative.



LABORATORY DATA:  Urine culture done yesterday has no growth.



ASSESSMENT:

1.  Multidrug resistant urinary tract infection.

2.  Klebsiella pneumonia urinary tract infection.

3.  History of urethral growth, status post resection and stent placement.

4.  Non-insulin dependent diabetes.

5.  Morbid obesity.

6.  Hypertension.



PLAN:  Currently patient is on metformin, losartan, Cymbalta, glipizide and

Januvia.  We will continue that.  Continue meropenem.  Discharge plan for

Sunday morning.





__________________________________________

Ashely Armstrong MD





DD:  02/16/2018 18:41:29

DT:  02/16/2018 22:09:08

Job # [id number]

## 2018-02-17 VITALS
RESPIRATION RATE: 18 BRPM | DIASTOLIC BLOOD PRESSURE: 67 MMHG | OXYGEN SATURATION: 98 % | SYSTOLIC BLOOD PRESSURE: 129 MMHG | TEMPERATURE: 99 F | HEART RATE: 75 BPM

## 2018-02-17 RX ADMIN — MEROPENEM AND SODIUM CHLORIDE SCH MLS/HR: 1 INJECTION, SOLUTION INTRAVENOUS at 17:23

## 2018-02-17 RX ADMIN — INSULIN HUMAN SCH: 100 INJECTION, SOLUTION PARENTERAL at 06:34

## 2018-02-17 RX ADMIN — INSULIN HUMAN SCH: 100 INJECTION, SOLUTION PARENTERAL at 22:09

## 2018-02-17 RX ADMIN — MEROPENEM AND SODIUM CHLORIDE SCH MLS/HR: 1 INJECTION, SOLUTION INTRAVENOUS at 06:14

## 2018-02-17 RX ADMIN — INSULIN HUMAN SCH UNITS: 100 INJECTION, SOLUTION PARENTERAL at 17:24

## 2018-02-17 RX ADMIN — INSULIN HUMAN SCH UNITS: 100 INJECTION, SOLUTION PARENTERAL at 12:08

## 2018-02-17 NOTE — PN
DATE:  02/17/2018



SUBJECTIVE:  Patient is in bed, in no acute distress, nontoxic.



PHYSICAL EXAMINATION:

VITAL SIGNS:  Temperature is 97, blood pressure is 129/70, respiratory rate

of 20, heart rate of 61.

HEENT:  Unremarkable.

NECK:  Supple.

LUNGS:  Have decreased breath sounds.

HEART:  Normal S1, S2.

ABDOMEN:  Soft, nontender.



LABORATORY DATA:  Has been reviewed.



ASSESSMENT AND PLAN:  A 73-year-old female, who was admitted with

extended-spectrum beta lactamase Klebsiella urinary tract infection and

cystitis with a renal stent, which was removed today, day number 7 of

meropenem.  We will complete 7 to 10 days of meropenem.  Patient wants to

be discharged and is feeling much better.





__________________________________________

Elver Narayan MD



DD:  02/17/2018 16:48:01

DT:  02/17/2018 19:21:57

Job # [id number]

## 2018-02-17 NOTE — PN
DATE:  02/16/2018



SUBJECTIVE:  The patient is seen early this morning in room 320.  Doing

well.  No fevers or chills.



PHYSICAL EXAMINATION:

VITAL SIGNS:  Temperature is 97, blood pressure is 130/80, and respiratory

rate of 16.

HEENT:   Unremarkable.

NECK:  Supple.

LUNGS:  Have decreased breath sounds.

HEART:  Normal S1 and S2.

ABDOMEN:  Soft and nontender.



LABORATORY DATA:  Laboratory examination is noted.  Repeat urine culture

has no growth.  Review of the orders reveals the patient to be on

meropenem.



ASSESSMENT AND PLAN:  This is a 73-year-old female with extended-spectrum

beta-lactamase Klebsiella urinary tract infection, possible cystitis, and

the patient had a renal stent, which was removed, today is day #6 of 7 to

10 days of meropenem.  We will discuss with you.  The patient is doing much

better.





__________________________________________

Elver Narayan MD



DD:  02/16/2018 19:03:45

DT:  02/16/2018 22:23:56

Job # [id number]

## 2018-02-18 RX ADMIN — INSULIN HUMAN SCH: 100 INJECTION, SOLUTION PARENTERAL at 11:56

## 2018-02-18 RX ADMIN — MEROPENEM AND SODIUM CHLORIDE SCH MLS/HR: 1 INJECTION, SOLUTION INTRAVENOUS at 05:30

## 2018-02-18 RX ADMIN — INSULIN HUMAN SCH: 100 INJECTION, SOLUTION PARENTERAL at 06:37

## 2018-02-18 NOTE — PN
DATE:  02/18/2018



SUBJECTIVE:  The patient is in bed, in no acute distress, nontoxic.



PHYSICAL EXAMINATION:

VITAL SIGNS:  Temperature is 99, blood pressure is 129/60, respiratory rate

of 20, heart rate of 75.

HEENT:  Examination of HEENT is unremarkable.

NECK:  Supple.

LUNGS:  Have decreased breath sounds.

HEART:  Normal S1 and S2.

ABDOMEN:  Soft, nontender.



LABORATORY DATA:  Laboratory examination reveals the urinalysis from

02/15/2018 shows 5 to 10 wbc's.  Microbiology reveals the urine culture has

no growth.



ASSESSMENT AND PLAN:  A 73-year-old female who was seen early this morning.

She states she wants to go home.  Admitted with extended-spectrum

beta-lactamase Klebsiella urinary tract infection and cystitis with a renal

stent which was removed.  Today is day #8 of antibiotics.  Complete 7 to 10

days of antibiotics.  Case discussed with Dr. Armstrong for the patient's

possible discharge today.





__________________________________________

Elver Narayan MD





DD:  02/18/2018 13:29:19

DT:  02/18/2018 15:28:50

Job # [id number]

## 2018-02-18 NOTE — PN
DATE:



SUBJECTIVE:  The patient is 73 years old, seen and examined, sitting in

chair, anxiously waiting to be discharged.  Denies any fever, chills,

nausea or vomiting.  Eating and tolerating.  No burning upon urination.  No

hematuria.



PHYSICAL EXAMINATION:

VITAL SIGNS:  She is afebrile, pulse 75, respirations 18, blood pressure

129/67.

LUNGS:  Bilateral fair airflow.  No rhonchi or crackles.

HEART:  S1 and S2 audible.

ABDOMEN:  Soft, obese, nontender, no rebound, no guarding.

NEUROLOGIC:  She is awake, alert, oriented, communicative, ambulatory.



LABORATORY DATA:  Repeat urinalysis is unremarkable.  Blood sugar is 287.



ASSESSMENT:

1.  Multidrug resistant urinary tract infection.

2.  Status post urethral growth that was excised a couple of months ago.

3.  Status post urethral stent insertion, that was removed later on.

4.  Morbid obesity.

5.  Non-insulin dependent diabetes.



PLAN:  The patient is currently on meropenem.  She will finish her last

dose by tomorrow morning, after that she will be discharged.  She will

follow with her PMD.







__________________________________________

Ashely Armstrong MD



DD:  02/17/2018 20:05:33

DT:  02/17/2018 23:44:03

Job # [id number]

## 2018-02-19 NOTE — DS
HISTORY OF PRESENT ILLNESS:  The patient is a 73-year-old, seen and

examined, lying in bed, seems to be feel comfortable.  No nausea.  No

vomiting.  No diarrhea.  Eating and tolerating.  No  fever or chills.  No

urinary symptom.  Patient was admitted because of multidrug-resistant

urinary tract infection.



PHYSICAL EXAMINATION:

VITAL SIGNS:  She is afebrile, pulse 75, respirations 18, blood pressure

129/67.

LUNGS:  Bilateral free air flow.  No rhonchi or crackle.

HEART:  S1, S2, audible.

ABDOMEN:  Soft, obese, nontender.  No rebound.  No guarding.

NEUROLOGIC:  She is awake, alert, oriented, communicative.



ASSESSMENT AND PLAN:

1.  Multidrug-resistant urinary tract infection, only sensitive to

intravenous antibiotics.

2.  History of urethral growth, status post resection.

3.  Morbid obesity.

4.  Non-insulin dependent diabetes.

5.  Hypertension.

6.  Hyperlipidemia.



PLAN:  The patient will finish her course of meropenem, _____ seems to be

asymptomatic.  She will resume her medication including metformin 1000

twice a day, Januvia 100 daily, Crestor 5 mg daily, multivitamin, losartan

100 daily, glipizide 15 mg twice a day, aspirin 81 mg daily.  She will

follow with her PMD and her urologist as outpatient.





__________________________________________

Ashely Armstrong MD



DD:  02/18/2018 15:20:20

DT:  02/19/2018 4:25:04

Job # [id number]

## 2018-06-11 ENCOUNTER — HOSPITAL ENCOUNTER (OUTPATIENT)
Dept: HOSPITAL 42 - SDS | Age: 73
Discharge: HOME | End: 2018-06-11
Attending: UROLOGY
Payer: MEDICARE

## 2018-06-11 VITALS — BODY MASS INDEX: 41.6 KG/M2

## 2018-06-11 VITALS — RESPIRATION RATE: 18 BRPM

## 2018-06-11 VITALS — DIASTOLIC BLOOD PRESSURE: 70 MMHG | SYSTOLIC BLOOD PRESSURE: 132 MMHG | HEART RATE: 68 BPM

## 2018-06-11 VITALS — OXYGEN SATURATION: 97 % | TEMPERATURE: 97.5 F

## 2018-06-11 DIAGNOSIS — E11.9: ICD-10-CM

## 2018-06-11 DIAGNOSIS — C66.1: Primary | ICD-10-CM

## 2018-06-11 DIAGNOSIS — I10: ICD-10-CM

## 2018-06-11 DIAGNOSIS — Z94.89: ICD-10-CM

## 2018-06-11 PROCEDURE — 88305 TISSUE EXAM BY PATHOLOGIST: CPT

## 2018-06-11 PROCEDURE — 74420 UROGRAPHY RTRGR +-KUB: CPT

## 2018-06-11 PROCEDURE — 52354 CYSTOURETERO W/BIOPSY: CPT

## 2018-06-11 NOTE — RAD
PROCEDURE:  Retrograde pyelogram



HISTORY:

Bilateral retrograde pyelogram



COMPARISON:





TECHNIQUE:

Fluoroscopy was provided in the operating room. 50.8 seconds of 

fluoro time.  Cumulative dose 20.37 mGy. Sixteen images submitted



FINDINGS:

The left ureter is unremarkable.  There is duplication of the 

collecting system on the left.  There are no filling defects.



There is dilatation of the right ureter and mild dilatation of the 

right renal pelvis. There is passage of a wire and ureteroscopy on 

the right



IMPRESSION:

As above

## 2019-01-03 ENCOUNTER — HOSPITAL ENCOUNTER (OUTPATIENT)
Dept: HOSPITAL 42 - PAT | Age: 74
End: 2019-01-03
Payer: MEDICARE

## 2019-01-03 DIAGNOSIS — C66.1: Primary | ICD-10-CM

## 2019-01-03 DIAGNOSIS — C66.9: ICD-10-CM

## 2019-01-08 ENCOUNTER — HOSPITAL ENCOUNTER (OUTPATIENT)
Dept: HOSPITAL 42 - RAD | Age: 74
End: 2019-01-08
Payer: MEDICARE

## 2019-01-08 DIAGNOSIS — R31.29: ICD-10-CM

## 2019-01-08 DIAGNOSIS — C66.1: Primary | ICD-10-CM

## 2019-01-14 ENCOUNTER — HOSPITAL ENCOUNTER (OUTPATIENT)
Dept: HOSPITAL 42 - SDS | Age: 74
Discharge: HOME | End: 2019-01-14
Attending: UROLOGY
Payer: MEDICARE

## 2019-01-14 VITALS — OXYGEN SATURATION: 96 % | TEMPERATURE: 97.6 F | RESPIRATION RATE: 20 BRPM

## 2019-01-14 VITALS — SYSTOLIC BLOOD PRESSURE: 138 MMHG | HEART RATE: 70 BPM | DIASTOLIC BLOOD PRESSURE: 76 MMHG

## 2019-01-14 VITALS — BODY MASS INDEX: 37 KG/M2

## 2019-01-14 DIAGNOSIS — Z94.89: ICD-10-CM

## 2019-01-14 DIAGNOSIS — C67.4: Primary | ICD-10-CM

## 2019-01-14 DIAGNOSIS — C66.1: ICD-10-CM

## 2019-01-14 LAB
BUN SERPL-MCNC: 33 MG/DL (ref 7–21)
CALCIUM SERPL-MCNC: 9.8 MG/DL (ref 8.4–10.5)
GFR NON-AFRICAN AMERICAN: 44

## 2019-01-14 PROCEDURE — 88305 TISSUE EXAM BY PATHOLOGIST: CPT

## 2019-01-14 PROCEDURE — 52234 CYSTOSCOPY AND TREATMENT: CPT

## 2019-01-14 PROCEDURE — 74420 UROGRAPHY RTRGR +-KUB: CPT

## 2019-01-14 PROCEDURE — 52354 CYSTOURETERO W/BIOPSY: CPT

## 2019-01-14 PROCEDURE — 36415 COLL VENOUS BLD VENIPUNCTURE: CPT

## 2019-01-14 PROCEDURE — 82948 REAGENT STRIP/BLOOD GLUCOSE: CPT

## 2019-01-14 PROCEDURE — 80048 BASIC METABOLIC PNL TOTAL CA: CPT

## 2019-01-14 NOTE — RAD
Date of service: 



01/14/2019



PROCEDURE:  Fluoroscopy up to 1 hr.



HISTORY:

R/O OBSTRUCTION



COMPARISON:

None



TECHNIQUE:

Standard protocol for this study/examination.



FINDINGS:

 Total fluoroscopic time (continuous mode) utilized during the 

procedure 89.4 seconds. 



Total exam DLP: 63.19 (mGy).



IMPRESSION:

Less than 1 hr fluoroscopic assistance provided during performance of 

the procedure.

## 2019-01-14 NOTE — OP
PROCEDURE DATE:  01/14/2019



PREOPERATIVE DIAGNOSIS:  History of ureteral cancer.



POSTOPERATIVE DIAGNOSES:  History of ureteral cancer plus bladder cancer.



PROCEDURES:  Cystoscopy, excision and fulguration of bladder tumor, left

retrograde pyelogram, right retrograde pyelogram, right ureteroscopy and

biopsy of right ureteral orifice.



ATTENDING SURGEON:  Julien Cannon MD



ANESTHESIA:  General.



SPECIMENS:  Bladder tumor from left posterior wall sent to pathology and

biopsies of right ureteral orifice sent to pathology.



DRAINS:  None.



COMPLICATIONS:  None.



OPERATIVE FINDINGS:  After informed consent was obtained, the patient was

taken to the operating room and placed on the operating table.  Anesthesia

was administered.  The patient was then placed in the dorsal lithotomy

position and prepped and draped in usual sterile fashion.  A 21-Serbian

cystoscope was passed into the patient's bladder under direct vision and a

full survey inspection was performed.  There were no stones or foreign

bodies noted.  There was grade 1 trabeculation noted.  On the left

posterior wall, there was a papillary tumor and a second smaller area of

raised erythema with some papillary growth.  The areas were confluent. 

There were no other areas of tumor noted.  The left ureteral orifice was

visualized and appeared within normal limits.  The right ureteral orifice

had some ragged previously treated tissue inside the orifice.  At this

point, a cold cup forceps was passed out.  The tumors noted on the left

posterior wall were then excised completely using the cold cup forceps. 

After the tumors were excised, a Bugbee electrode was then passed and the

tumor site was fulgurated along with areas of surrounding normal mucosa. 

The entire suspicious area was adequately treated.  At this point, using

the cold cup forceps, multiple biopsies were taken inside the native right

ureteral orifice and biopsies were taken of the surrounding area of the

orifice.  These biopsies were sent separately as specimen.  The Bugbee

electrode again was used to cauterize the biopsy sites and inside the _____

orifice was again treated using the Bugbee electrode.  At this point, the

Bugbee was removed and an open-ended ureteral catheter was passed.  First,

it was passed into the left ureteral orifice and a left retrograde

pyelogram was performed.  The left retrograde pyelogram appeared within

normal limits.  There was no evidence of filling defects or obstruction

noted.  At this point, the open-ended ureteral catheter was placed into the

transplanted right ureteral orifice which was located in the superior

portion of the right lateral wall.  When inside the orifice, contrast was

instilled for right retrograde pyelogram.  There appeared to be some mild

chronic dilatation of the right renal pelvis.  There was again no overt

filling defects noted in the collecting system or the ureter.  A sensor

wire was then obtained and it was passed through the open-ended ureteral

catheter and advanced up the ureter under fluoroscopic guidance until it

coiled in the upper collecting system.  A second wire was then passed, a

zip wire, and also was coiled in the collecting system.  The bladder was

drained and the cystoscope was removed.  Flexible ureteroscope was then

obtained and it was passed over the Glidewire; however, given the patient's

anatomy, the scope was coiling in the bladder and was unable to be passed

over the wire into the transplanted right ureter.  At this point, the

flexible ureteroscope was removed and a 7.5 Serbian semirigid ureteroscope

was obtained.  Using a semirigid ureteroscope, I was able to cannulate the

transplant orifice and advance the scope without any difficulty up the

ureter until the renal pelvis was reached.  Exam of the ureter was within

normal limits.  There were no papillary tumors or suspicious lesions noted.

The renal pelvis was clear of any abnormalities.  I could visualize the

upper lateral calyces; however, the scope could not be advanced into them

and at this point, the ureteroscope was withdrawn under direct vision. 

Again, there were no suspicious lesions in the collecting system or ureter

noted.  At this point, the ureteroscope was advanced into the native right

ureteral orifice.  I was able to look inside using the scope, there was

fulgurated ragged tissue, but there was no obvious untreated tumor.  At

this point, the procedure was completed.  The cystoscope was then

re-passed.  The guidewires were removed.  The bladder was drained and the

cystoscope was removed.  The patient tolerated the procedure well.  She was

returned to the supine position and taken to the recovery room awake in

stable condition.







__________________________________________

Julien Cannon MD





DD:  01/14/2019 12:05:35

DT:  01/14/2019 12:10:13

Job # [id number]

## 2024-12-05 NOTE — OP
PROCEDURE DATE:  06/11/2018



PREOPERATIVE DIAGNOSIS:  Ureteral tumor.



POSTOPERATIVE DIAGNOSIS:  Ureteral tumor.



PROCEDURES:  Cystoscopy, bilateral retrograde pyelograms, ureteroscopy of

transplanted ureter, ureteroscopy of native ureter, excision and

fulguration of ureteral tumor.



ATTENDING SURGEON:  Dr. Julien Cannon.



ANESTHESIA:  General.



SPECIMENS:  Ureteral tumor sent to pathology.



DRAINS:  None.



COMPLICATIONS:  None.



OPERATIVE FINDINGS:  After informed consent was obtained, the patient was

taken to the Operating Room, placed on operating table.  Anesthesia was

administered.  The patient was placed in dorsolithotomy position and

prepped and draped in usual sterile fashion.  The patient received IV

antibiotics prior to start of the procedure.  A 22-Serbian cystoscope was

passed into the patient's bladder and a full survey inspection was

performed.  There were no stones or foreign bodies noted.  The left

ureteral orifice was visualized and appeared within normal limits.  The

right ureteral orifice was visualized.  There appeared to be some tumor

just inside the ureter.  There was an opening noted on the right superior

posterior wall, which appeared to be the transplanted ureteral orifice. 

The patient had a history of a prior distal ureterectomy with a ureteral

reimplant.  At this point, a cone-tip catheter was obtained.  It was passed

through the cystoscope and guided into the left ureteral orifice.  A

retrograde pyelogram was then performed by instilling contrast through the

left cone-tip catheter into the left ureter during real-time fluoroscopy. 

The left ureter appeared within normal limits.  There is no evidence of

filling defect noted.  The upper collecting system appeared also within

normal limits with no filling defect noted.  The system was noted to drain

promptly with no evidence of obstruction.  At this point, the cone-tip

catheter was guided into the native right orifice.  Contrast was filled

into the opening.  There was a small part of ureter noted.  Most of the

contrast was noted to backflow into the bladder.  At this point, the

catheter was advanced into the opening which appeared to be transplanted

orifice.  Contrast was then instilled into this opening, which did reveal

the remaining portion of the ureter and contrast was then instilled into

the right kidney.  There was no evidence of filling defect noted.  There

was no hydronephrosis or evidence of obstruction.  At this point, the

cone-tip catheter was withdrawn.  An open-ended ureteral catheter was then

passed and a sensor wire was obtained and passed through the open-ended

catheter.  It was advanced up the ureter and coiled in the upper collecting

system.  At this point, the bladder was drained and an 8-Serbian semi-rigid

ureteroscope was passed into the bladder and guided into the transplanted

ureteral orifice.  The ureteroscope was easily advanced up the ureter under

direct vision and into the kidney.  The kidney was inspected.  There was no

evidence of any tumor or abnormality noted in the renal pelvis or upper

collecting system, could visualize into the upper and mid calyces.  There

was no obvious tumor noted.  At this point, the ureteroscope was withdrawn

under direct vision.  Again, the ureter was inspected.  There were no

ureteral tumors or other abnormalities noted.  At this point, the

ureteroscope was withdrawn, the cystoscope was repassed and a cold cup

biopsy forceps was passed into the native right ureteral orifice.  The

pieces of the tumor were then grasped and withdrawn and sent to pathology

as specimen.  A Bovie electrode was then used to cauterize inside the

orifice fulgurating the remaining tumor.  After the fulguration was

completed, the bladder was drained, cystoscope was removed.  The

ureteroscope was then repassed.  It was able to be passed into the native

ureter and inspection was made.  There was no remaining untreated tumor or

tissue.  There was a tiny area, which had not been fulgurated and a Bovie

electrode was passed through the ureteroscope and this area also was

cauterized in order to completely ablate any remaining mucosa in the

ureteral tunnel.  There was complete hemostasis from the excision and

fulguration sites.  At this point, the procedure was completed, the

ureteroscope was removed.  The cystoscope was repassed and the bladder was

then drained.  There was no active bleeding.  There was clear efflux noted

from the transplanted orifice and from the native left orifice and at this

point, the procedure was complete, the bladder was drained.  The cystoscope

was removed.  The patient tolerated the procedure well.  She was returned

to the supine position and taken to the Recovery Room, awake in stable

condition.







__________________________________________

Julien Cannon MD



DD:  06/11/2018 12:08:41

DT:  06/11/2018 12:12:24

Job # [id number] Detail Level: Detailed Detail Level: Zone